# Patient Record
Sex: FEMALE | HISPANIC OR LATINO | Employment: UNEMPLOYED | URBAN - METROPOLITAN AREA
[De-identification: names, ages, dates, MRNs, and addresses within clinical notes are randomized per-mention and may not be internally consistent; named-entity substitution may affect disease eponyms.]

---
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
## 2023-01-02 ENCOUNTER — HOSPITAL ENCOUNTER (EMERGENCY)
Facility: HOSPITAL | Age: 23
Discharge: HOME | End: 2023-01-02
Attending: EMERGENCY MEDICINE

## 2023-01-02 VITALS
OXYGEN SATURATION: 100 % | SYSTOLIC BLOOD PRESSURE: 102 MMHG | RESPIRATION RATE: 16 BRPM | TEMPERATURE: 98.3 F | DIASTOLIC BLOOD PRESSURE: 58 MMHG | HEART RATE: 100 BPM

## 2023-01-02 DIAGNOSIS — R10.9 ABDOMINAL PAIN: ICD-10-CM

## 2023-01-02 DIAGNOSIS — K52.9 GASTROENTERITIS: Primary | ICD-10-CM

## 2023-01-02 LAB
EXT PREGNANCY TEST URINE: NEGATIVE
EXT. CONTROL: NORMAL

## 2023-01-02 RX ORDER — ONDANSETRON 4 MG/1
4 TABLET, ORALLY DISINTEGRATING ORAL ONCE
Status: COMPLETED | OUTPATIENT
Start: 2023-01-02 | End: 2023-01-02

## 2023-01-02 RX ORDER — ONDANSETRON 4 MG/1
4 TABLET, FILM COATED ORAL EVERY 6 HOURS
Qty: 12 TABLET | Refills: 0 | Status: SHIPPED | OUTPATIENT
Start: 2023-01-02

## 2023-01-02 RX ORDER — ONDANSETRON 4 MG/1
4 TABLET, FILM COATED ORAL EVERY 6 HOURS
Qty: 12 TABLET | Refills: 0 | Status: SHIPPED | OUTPATIENT
Start: 2023-01-02 | End: 2023-01-02 | Stop reason: SDUPTHER

## 2023-01-02 RX ORDER — DICYCLOMINE HCL 20 MG
20 TABLET ORAL ONCE
Status: COMPLETED | OUTPATIENT
Start: 2023-01-02 | End: 2023-01-02

## 2023-01-02 RX ORDER — KETOROLAC TROMETHAMINE 30 MG/ML
15 INJECTION, SOLUTION INTRAMUSCULAR; INTRAVENOUS ONCE
Status: COMPLETED | OUTPATIENT
Start: 2023-01-02 | End: 2023-01-02

## 2023-01-02 RX ADMIN — DICYCLOMINE HYDROCHLORIDE 20 MG: 20 TABLET ORAL at 16:37

## 2023-01-02 RX ADMIN — KETOROLAC TROMETHAMINE 15 MG: 30 INJECTION, SOLUTION INTRAMUSCULAR; INTRAVENOUS at 16:37

## 2023-01-02 RX ADMIN — ONDANSETRON 4 MG: 4 TABLET, ORALLY DISINTEGRATING ORAL at 16:08

## 2023-01-02 NOTE — ED PROVIDER NOTES
History  Chief Complaint   Patient presents with   • Abdominal Pain     Started after patient ate with abdominal pain, nausea, vomiting and diarrhea  Possibly thinks it's food poisoning  19-year-old female patient presenting with nausea vomiting diarrhea, abdominal pain onset yesterday  Patient states that she had "cheese dogs and sandwich" yesterday and since then has been having diarrhea  Patient states multiple episodes of nonbloody diarrhea yesterday  Patient states that she has been having abdominal pain and woke up with abdominal pain this morning  Patient states multiple episodes of nonbloody vomiting today  States LMP was 2 weeks ago  Denies fever, chest pain, shortness of breath, cough, urinary symptoms  Denies taking any medications for symptoms  Prior to Admission Medications   Prescriptions Last Dose Informant Patient Reported? Taking?   predniSONE 20 mg tablet   No No   Sig: Take 2 tabs daily for 5 days  Patient not taking: Reported on 2/28/2019      Facility-Administered Medications: None       Past Medical History:   Diagnosis Date   • Asthma 9/18/2012   • Otitis media    • Visual impairment     wears glasses       History reviewed  No pertinent surgical history  Family History   Problem Relation Age of Onset   • No Known Problems Mother    • Diabetes Father    • Hypertension Father      I have reviewed and agree with the history as documented  E-Cigarette/Vaping     E-Cigarette/Vaping Substances     Social History     Tobacco Use   • Smoking status: Never   • Smokeless tobacco: Never   Substance Use Topics   • Alcohol use: Never   • Drug use: Never        Review of Systems   Constitutional: Negative for fever  Gastrointestinal: Positive for abdominal pain, diarrhea, nausea and vomiting  All other systems reviewed and are negative        Physical Exam  ED Triage Vitals [01/02/23 1422]   Temperature Pulse Respirations Blood Pressure SpO2   98 3 °F (36 8 °C) 100 16 102/58 100 %      Temp Source Heart Rate Source Patient Position - Orthostatic VS BP Location FiO2 (%)   Oral Monitor Sitting Left arm --      Pain Score       10 - Worst Possible Pain             Orthostatic Vital Signs  Vitals:    01/02/23 1422   BP: 102/58   Pulse: 100   Patient Position - Orthostatic VS: Sitting       Physical Exam  Vitals reviewed  Constitutional:       Appearance: Normal appearance  HENT:      Head: Normocephalic and atraumatic  Nose: Nose normal       Mouth/Throat:      Mouth: Mucous membranes are moist       Pharynx: Oropharynx is clear  Eyes:      Extraocular Movements: Extraocular movements intact  Conjunctiva/sclera: Conjunctivae normal    Cardiovascular:      Rate and Rhythm: Normal rate and regular rhythm  Pulses: Normal pulses  Heart sounds: Normal heart sounds  Pulmonary:      Effort: Pulmonary effort is normal       Breath sounds: Normal breath sounds  Abdominal:      General: Bowel sounds are normal       Palpations: Abdomen is soft  Tenderness: There is abdominal tenderness (Mild tenderness) in the right lower quadrant  There is no guarding or rebound  Negative signs include Rovsing's sign, psoas sign and obturator sign  Musculoskeletal:         General: Normal range of motion  Cervical back: Normal range of motion  Skin:     General: Skin is warm and dry  Neurological:      General: No focal deficit present  Mental Status: She is alert and oriented to person, place, and time  Mental status is at baseline           ED Medications  Medications   ondansetron (ZOFRAN-ODT) dispersible tablet 4 mg (4 mg Oral Given 1/2/23 1608)   dicyclomine (BENTYL) tablet 20 mg (20 mg Oral Given 1/2/23 1637)   ketorolac (TORADOL) injection 15 mg (15 mg Intramuscular Given 1/2/23 1637)       Diagnostic Studies  Results Reviewed     Procedure Component Value Units Date/Time    POCT pregnancy, urine [[id number]]  (Normal) Resulted: 01/02/23 1630    Lab Status: Final result Updated: 01/02/23 1719     EXT Preg Test, Ur Negative     Control Valid                 No orders to display         Procedures  Procedures      ED Course                                       Medical Decision Making  24 y/o female patient with history of asthma presenting with nausea vomiting diarrhea, abdominal pain onset yesterday  Patient states started after she had breakfast yesterday  Likely from what she ate versus gastroenteritis  No fevers or cold-like symptoms  Patient treated with Bentyl, Toradol, Zofran with improvement of symptoms  Patient able to tolerate p o  and drinking and eating  Abdominal pain: acute illness or injury  Gastroenteritis: acute illness or injury  Amount and/or Complexity of Data Reviewed  Labs: ordered  Details: Urine pregnancy negative  Discussion of management or test interpretation with external provider(s): Likely with gastroenteritis  Patient tx with zofran, toradol and bentyl  Patient improved, toelrating PO  Patient discharged with zofran  Stable for discharge with follow-up with PCP  Return precautions given  Risk  Prescription drug management  Disposition  Final diagnoses:   Gastroenteritis   Abdominal pain     Time reflects when diagnosis was documented in both MDM as applicable and the Disposition within this note     Time User Action Codes Description Comment    1/2/2023  5:08 PM Clementina TAYLOR [facility] Add [K52 9] Gastroenteritis     1/2/2023  5:08 PM Damaris Urbina Add [R10 9] Abdominal pain       ED Disposition     ED Disposition   Discharge    Condition   Stable    Date/Time   Mon Jan 2, 2023  5:08 PM    Comment   Eva Araujo discharge to home/self care                 Follow-up Information     Follow up With Specialties Details Why Contact Info    Shasta Terrell MD Pediatrics Schedule an appointment as soon as possible for a visit   [address]  [address] [facility],[address]  [address]            Discharge Medication List as of 1/2/2023  5:12 PM      START taking these medications    Details   ondansetron (ZOFRAN) 4 mg tablet Take 1 tablet (4 mg total) by mouth every 6 (six) hours, Starting Mon 1/2/2023, Normal         CONTINUE these medications which have NOT CHANGED    Details   predniSONE 20 mg tablet Take 2 tabs daily for 5 days  , Print           No discharge procedures on file  PDMP Review     None           ED Provider  Attending physically available and evaluated Robertlesley Kirti CHANG managed the patient along with the ED Attending      Electronically Signed by         Floresita Whitley MD  01/03/23 [id number]

## 2023-01-02 NOTE — ED ATTENDING ATTESTATION
1/2/2023  ICamilo MD, saw and evaluated the patient  I have discussed the patient with the resident/non-physician practitioner and agree with the resident's/non-physician practitioner's findings, Plan of Care, and MDM as documented in the resident's/non-physician practitioner's note, except where noted  All available labs and Radiology studies were reviewed  I was present for key portions of any procedure(s) performed by the resident/non-physician practitioner and I was immediately available to provide assistance  At this point I agree with the current assessment done in the Emergency Department  I have conducted an independent evaluation of this patient a history and physical is as follows:    ED Course         Critical Care Time  Procedures    22 yo female with no pmh, c/o abdominal pain n/v/d since yesterday after eating a corndog  No other sick contacts  No urinary complaints, no fever  Pt with abdominal pain and cramping  No cp, no sob  Pt tolerating liquids  Vss, afebrile, tachy, lungs cta, rrr, abdomen soft tender rlq, no rebound, no guarding  Likely food poisoning, viral illness  Bentyl, zofran, toradol im, po challenge  No concern for appendicitis

## 2023-01-02 NOTE — DISCHARGE INSTRUCTIONS
You were seen in the ED for gastroenteritis  Return to the ED for any worsening symptoms or new symptoms  Follow up with your primary care doctor as soon as possible  Take zofran for your symptoms

## 2023-01-02 NOTE — Clinical Note
Cosmo Panda was seen and treated in our emergency department on 1/2/2023  Diagnosis: gastroenteritis    Freida Osorio  may return to work on return date  She may return on this date: 01/04/2023         If you have any questions or concerns, please don't hesitate to call        Debra French MD    ______________________________           _______________          _______________  [facility] Representative                              Date                                Time

## 2023-03-15 ENCOUNTER — TELEPHONE (OUTPATIENT)
Dept: OBGYN CLINIC | Facility: CLINIC | Age: 23
End: 2023-03-15

## 2023-03-15 NOTE — TELEPHONE ENCOUNTER
Pt submitted email request for gyn appt, documenting that it may be urgent  LM for pt to contact me for appt assistance

## 2023-03-27 ENCOUNTER — OFFICE VISIT (OUTPATIENT)
Dept: OBGYN CLINIC | Facility: CLINIC | Age: 23
End: 2023-03-27

## 2023-03-27 VITALS
SYSTOLIC BLOOD PRESSURE: 100 MMHG | HEART RATE: 68 BPM | WEIGHT: 106.8 LBS | BODY MASS INDEX: 20.97 KG/M2 | DIASTOLIC BLOOD PRESSURE: 62 MMHG | HEIGHT: 60 IN

## 2023-03-27 DIAGNOSIS — N89.8 VAGINAL DISCHARGE: ICD-10-CM

## 2023-03-27 DIAGNOSIS — B37.31 VULVOVAGINAL CANDIDIASIS: Primary | ICD-10-CM

## 2023-03-27 DIAGNOSIS — R30.0 BURNING WITH URINATION: ICD-10-CM

## 2023-03-27 DIAGNOSIS — Z11.3 SCREEN FOR STD (SEXUALLY TRANSMITTED DISEASE): ICD-10-CM

## 2023-03-27 LAB
BV WHIFF TEST VAG QL: NEGATIVE
CLUE CELLS SPEC QL WET PREP: NEGATIVE
PH SMN: 4 [PH]
SL AMB POCT WET MOUNT: ABNORMAL
T VAGINALIS VAG QL WET PREP: NEGATIVE
YEAST VAG QL WET PREP: POSITIVE

## 2023-03-27 RX ORDER — FLUCONAZOLE 150 MG/1
150 TABLET ORAL ONCE
Qty: 1 TABLET | Refills: 0 | Status: SHIPPED | OUTPATIENT
Start: 2023-03-27 | End: 2023-03-27

## 2023-03-27 RX ORDER — TRIAMCINOLONE ACETONIDE 1 MG/G
CREAM TOPICAL 2 TIMES DAILY
Qty: 30 G | Refills: 0 | Status: SHIPPED | OUTPATIENT
Start: 2023-03-27

## 2023-03-27 NOTE — PROGRESS NOTES
PROBLEM GYNECOLOGICAL VISIT    Ruthie Lilly is a 23 y o  female who presents today with complaint of vaginal discharge  Her general medical history has been reviewed and she reports it as follows:    Past Medical History:   Diagnosis Date   • Asthma 9/18/2012   • Otitis media    • Visual impairment     wears glasses     History reviewed  No pertinent surgical history  OB History    No obstetric history on file  Social History     Tobacco Use   • Smoking status: Never   • Smokeless tobacco: Never   Vaping Use   • Vaping Use: Never used   Substance Use Topics   • Alcohol use: Never   • Drug use: Never     Social History     Substance and Sexual Activity   Sexual Activity Yes   • Partners: Male   • Birth control/protection: None    Comment: once       Current Outpatient Medications   Medication Instructions   • ondansetron (ZOFRAN) 4 mg, Oral, Every 6 hours   • predniSONE 20 mg tablet Take 2 tabs daily for 5 days  History of Present Illness:   Matt Babb presents today reporting vaginal discharge  She reports that initially symptoms started a week or so ago as discomfort with urination  She then noticed mild swelling in her labia, which has since resolved  She then developed vulvar itching and clumpy, white discharge  She reports a new sexual partner recently  Does frequently use Kam Lamonte wash and scented wipes  Review of Systems:  Review of Systems   Constitutional: Negative  Gastrointestinal: Negative  Genitourinary: Positive for vaginal discharge  Physical Exam:  /62 (BP Location: Right arm, Patient Position: Sitting, Cuff Size: Adult)   Pulse 68   Ht 5' (1 524 m)   Wt 48 4 kg (106 lb 12 8 oz)   LMP 03/07/2023   BMI 20 86 kg/m²   Physical Exam  Constitutional:       General: She is not in acute distress  Appearance: Normal appearance  Genitourinary:      No lesions in the vagina  Vulva exam comments: Erythema   Vaginal discharge and erythema present  Right Adnexa: not tender, not full and no mass present  Left Adnexa: not tender, not full and no mass present  No cervical motion tenderness or lesion  Neurological:      Mental Status: She is alert  Skin:     General: Skin is warm and dry  Psychiatric:         Mood and Affect: Mood normal          Behavior: Behavior normal    Vitals reviewed  Point of Care Testing:   -Wet mount: +yeast, -clue cells, -trich    -KOH mount: +yeast    -Whiff: negative    -pH 4    Assessment:   1  Vulvovaginal candidiasis    2  STI screening     Plan:   1  Rx for Diflucan  Rx for topical triamcinolone cream     2  GC/CT culture collected    3  Orders placed for HIV, hep B, hep C and syphilis blood screenings  4  Discussed vulvar skin care measures  Recommended discontinuing Lv Fletcher products and scented wipes  5  Return for annual and pap smear in October after 23st birthday  Reviewed with patient that test results are available in MyChart immediately, but that they will not necessarily be reviewed by me immediately  Explained that I will review results at my earliest opportunity and contact patient appropriately

## 2023-03-28 LAB — BACTERIA UR CULT: NORMAL

## 2023-03-29 LAB
M GENITALIUM DNA SPEC QL NAA+PROBE: NEGATIVE
T VAGINALIS DNA SPEC QL NAA+PROBE: NEGATIVE

## 2023-04-03 ENCOUNTER — TELEPHONE (OUTPATIENT)
Dept: OBGYN CLINIC | Facility: CLINIC | Age: 23
End: 2023-04-03

## 2023-04-03 LAB
C TRACH DNA SPEC QL NAA+PROBE: ABNORMAL
N GONORRHOEA DNA SPEC QL NAA+PROBE: ABNORMAL

## 2023-04-03 NOTE — TELEPHONE ENCOUNTER
S/w pt gave her results, pt stated that she will come into the office to rpt gcct, appt scheduled for 4/4/ with FM resident

## 2023-04-03 NOTE — TELEPHONE ENCOUNTER
----- Message from [address] [place] [address] sent at 4/3/2023  2:30 PM EDT -----  Could we please let her know that the gonorrhea/chlamydia culture was unable to be processed, this was likely due to the yeast discharge she had  She can either have this recollected in our office or in the outpatient lab with a urine sample  The tri  chomonas test was negative  Thank you!

## 2023-04-04 ENCOUNTER — OFFICE VISIT (OUTPATIENT)
Dept: OBGYN CLINIC | Facility: CLINIC | Age: 23
End: 2023-04-04

## 2023-04-04 VITALS
WEIGHT: 106.8 LBS | DIASTOLIC BLOOD PRESSURE: 63 MMHG | BODY MASS INDEX: 20.97 KG/M2 | HEIGHT: 60 IN | HEART RATE: 63 BPM | SYSTOLIC BLOOD PRESSURE: 114 MMHG

## 2023-04-04 DIAGNOSIS — Z11.3 SCREEN FOR STD (SEXUALLY TRANSMITTED DISEASE): Primary | ICD-10-CM

## 2023-04-04 NOTE — ASSESSMENT & PLAN NOTE
High risk of STI with multiple partners and does not use condoms  GC/CT collected at previous visit unable to be processed possibly due to yeast discharge  -recollected via speculum exam on this visit  Again thick white discharge visualized though pt reports symptoms have improved  -in the case for some reason sample again cannot be processed, will order urine lab collect for GC/CT  Discussed with pt she can go to lab for this if today's culture is unable to be processed and she is agreeable   Locations reviewed  -counseling on condom use and decreasing risk of STI   -encouraged to get labwork ordered at previous visit for screenings  -f/u as needed

## 2023-04-04 NOTE — PROGRESS NOTES
Assessment/Plan:    Screen for STD (sexually transmitted disease)  High risk of STI with multiple partners and does not use condoms  GC/CT collected at previous visit unable to be processed possibly due to yeast discharge  -recollected via speculum exam on this visit  Again thick white discharge visualized though pt reports symptoms have improved  -in the case for some reason sample again cannot be processed, will order urine lab collect for GC/CT  Discussed with pt she can go to lab for this if today's culture is unable to be processed and she is agreeable  Locations reviewed  -counseling on condom use and decreasing risk of STI   -encouraged to get labwork ordered at previous visit for screenings  -f/u as needed        Diagnoses and all orders for this visit:    Screen for STD (sexually transmitted disease)  -     Chlamydia/GC amplified DNA by PCR  -     Chlamydia/GC amplified DNA by PCR; Future          Subjective:      Patient ID: Em Casanova is a 23 y o  female  HPI   24 yo female presenting for retest GC/CT  Last visit on 3/27/23 for complaint of vaginal discharge and found to have yeast which was treated  GC/CT culture collected at the time however it was unable to be processed possibly because of the yeast discharge  Pt wanted to come back into office for recollection  Pt reports sexually active with one partner but previously 2 partners, does not use condoms  She does report previous discharge has now improved and she no longer has symptoms  Did not complete labwork ordered at previous visit as she did not know where to go for the bloodwork  This was reviewed with patient  The following portions of the patient's history were reviewed and updated as appropriate: allergies, current medications, past family history, past medical history, past social history, past surgical history and problem list     Review of Systems   Constitutional: Negative for chills and fever     HENT: Negative for ear pain and sore throat  Eyes: Negative for pain and visual disturbance  Respiratory: Negative for cough and shortness of breath  Cardiovascular: Negative for chest pain and palpitations  Gastrointestinal: Negative for abdominal pain and vomiting  Genitourinary: Negative for difficulty urinating, dysuria, frequency, hematuria, urgency, vaginal discharge and vaginal pain  Musculoskeletal: Negative for arthralgias and back pain  Skin: Negative for color change and rash  Neurological: Negative for seizures, syncope and headaches  All other systems reviewed and are negative  Objective:    /63   Pulse 63   Ht 5' (1 524 m)   Wt 48 4 kg (106 lb 12 8 oz)   LMP 03/07/2023   BMI 20 86 kg/m²      Physical Exam  Vitals reviewed  Exam conducted with a chaperone present  Constitutional:       General: She is not in acute distress  Appearance: Normal appearance  She is well-developed  HENT:      Head: Normocephalic and atraumatic  Eyes:      Extraocular Movements: Extraocular movements intact  Cardiovascular:      Rate and Rhythm: Normal rate and regular rhythm  Pulmonary:      Effort: Pulmonary effort is normal  No respiratory distress  Breath sounds: Normal breath sounds  Abdominal:      General: Abdomen is flat  Palpations: Abdomen is soft  Genitourinary:     General: Normal vulva  Vagina: Vaginal discharge present  Cervix: Normal       Comments: Speculum exam performed for sample collection  Thick white discharge noted  Otherwise no erythema, lesions, or other abnormalities   Musculoskeletal:         General: No tenderness or deformity  Normal range of motion  Cervical back: Normal range of motion and neck supple  Skin:     General: Skin is warm and dry  Findings: No rash  Neurological:      General: No focal deficit present  Mental Status: She is alert  Mental status is at baseline     Psychiatric:         Mood and Affect: Mood normal  Behavior: Behavior normal              Valdo Lockett MD, PGY3  [facility]  Date: 4/4/2023 Time: 8:59 AM

## 2023-04-05 LAB
C TRACH DNA SPEC QL NAA+PROBE: NEGATIVE
N GONORRHOEA DNA SPEC QL NAA+PROBE: NEGATIVE

## 2023-05-11 ENCOUNTER — TELEPHONE (OUTPATIENT)
Dept: PEDIATRICS CLINIC | Facility: CLINIC | Age: 23
End: 2023-05-11

## 2023-05-11 NOTE — TELEPHONE ENCOUNTER
Please remove our office as the pcp as patient is out of our age range and was never seen at our practice

## 2023-06-03 PROBLEM — Z11.3 SCREEN FOR STD (SEXUALLY TRANSMITTED DISEASE): Status: RESOLVED | Noted: 2023-04-04 | Resolved: 2023-06-03

## 2023-11-09 ENCOUNTER — APPOINTMENT (EMERGENCY)
Dept: CT IMAGING | Facility: HOSPITAL | Age: 23
End: 2023-11-09
Payer: COMMERCIAL

## 2023-11-09 ENCOUNTER — HOSPITAL ENCOUNTER (EMERGENCY)
Facility: HOSPITAL | Age: 23
Discharge: HOME | End: 2023-11-09
Attending: EMERGENCY MEDICINE
Payer: COMMERCIAL

## 2023-11-09 VITALS
SYSTOLIC BLOOD PRESSURE: 121 MMHG | TEMPERATURE: 97.9 F | RESPIRATION RATE: 18 BRPM | HEART RATE: 72 BPM | OXYGEN SATURATION: 99 % | DIASTOLIC BLOOD PRESSURE: 55 MMHG

## 2023-11-09 DIAGNOSIS — N20.1 LEFT URETERAL STONE: Primary | ICD-10-CM

## 2023-11-09 DIAGNOSIS — N23 RENAL COLIC: ICD-10-CM

## 2023-11-09 LAB
ALBUMIN SERPL BCP-MCNC: 4.2 G/DL (ref 3.5–5)
ALP SERPL-CCNC: 65 U/L (ref 34–104)
ALT SERPL W P-5'-P-CCNC: 8 U/L (ref 7–52)
ANION GAP SERPL CALCULATED.3IONS-SCNC: 5 MMOL/L
AST SERPL W P-5'-P-CCNC: 14 U/L (ref 13–39)
BACTERIA UR QL AUTO: ABNORMAL /HPF
BASOPHILS # BLD AUTO: 0.06 THOUSANDS/ÂΜL (ref 0–0.1)
BASOPHILS NFR BLD AUTO: 1 % (ref 0–1)
BILIRUB SERPL-MCNC: 0.44 MG/DL (ref 0.2–1)
BILIRUB UR QL STRIP: NEGATIVE
BUN SERPL-MCNC: 15 MG/DL (ref 5–25)
CALCIUM SERPL-MCNC: 8.6 MG/DL (ref 8.4–10.2)
CHLORIDE SERPL-SCNC: 106 MMOL/L (ref 96–108)
CLARITY UR: CLEAR
CO2 SERPL-SCNC: 24 MMOL/L (ref 21–32)
COLOR UR: YELLOW
CREAT SERPL-MCNC: 0.87 MG/DL (ref 0.6–1.3)
EOSINOPHIL # BLD AUTO: 0.17 THOUSAND/ÂΜL (ref 0–0.61)
EOSINOPHIL NFR BLD AUTO: 2 % (ref 0–6)
ERYTHROCYTE [DISTWIDTH] IN BLOOD BY AUTOMATED COUNT: 13 % (ref 11.6–15.1)
EXT PREGNANCY TEST URINE: NEGATIVE
EXT. CONTROL: NORMAL
GFR SERPL CREATININE-BSD FRML MDRD: 95 ML/MIN/1.73SQ M
GLUCOSE SERPL-MCNC: 104 MG/DL (ref 65–140)
GLUCOSE UR STRIP-MCNC: NEGATIVE MG/DL
HCT VFR BLD AUTO: 41.9 % (ref 34.8–46.1)
HGB BLD-MCNC: 13.4 G/DL (ref 11.5–15.4)
HGB UR QL STRIP.AUTO: ABNORMAL
IMM GRANULOCYTES # BLD AUTO: 0.07 THOUSAND/UL (ref 0–0.2)
IMM GRANULOCYTES NFR BLD AUTO: 1 % (ref 0–2)
KETONES UR STRIP-MCNC: NEGATIVE MG/DL
LEUKOCYTE ESTERASE UR QL STRIP: NEGATIVE
LIPASE SERPL-CCNC: 36 U/L (ref 11–82)
LYMPHOCYTES # BLD AUTO: 3.57 THOUSANDS/ÂΜL (ref 0.6–4.47)
LYMPHOCYTES NFR BLD AUTO: 36 % (ref 14–44)
MCH RBC QN AUTO: 28.1 PG (ref 26.8–34.3)
MCHC RBC AUTO-ENTMCNC: 32 G/DL (ref 31.4–37.4)
MCV RBC AUTO: 88 FL (ref 82–98)
MONOCYTES # BLD AUTO: 0.73 THOUSAND/ÂΜL (ref 0.17–1.22)
MONOCYTES NFR BLD AUTO: 7 % (ref 4–12)
MUCOUS THREADS UR QL AUTO: ABNORMAL
NEUTROPHILS # BLD AUTO: 5.46 THOUSANDS/ÂΜL (ref 1.85–7.62)
NEUTS SEG NFR BLD AUTO: 53 % (ref 43–75)
NITRITE UR QL STRIP: NEGATIVE
NON-SQ EPI CELLS URNS QL MICRO: ABNORMAL /HPF
NRBC BLD AUTO-RTO: 0 /100 WBCS
PH UR STRIP.AUTO: 5.5 [PH]
PLATELET # BLD AUTO: 270 THOUSANDS/UL (ref 149–390)
PMV BLD AUTO: 9.7 FL (ref 8.9–12.7)
POTASSIUM SERPL-SCNC: 3.5 MMOL/L (ref 3.5–5.3)
PROT SERPL-MCNC: 7.3 G/DL (ref 6.4–8.4)
PROT UR STRIP-MCNC: ABNORMAL MG/DL
RBC # BLD AUTO: 4.77 MILLION/UL (ref 3.81–5.12)
RBC #/AREA URNS AUTO: ABNORMAL /HPF
SODIUM SERPL-SCNC: 135 MMOL/L (ref 135–147)
SP GR UR STRIP.AUTO: 1.03 (ref 1–1.03)
UROBILINOGEN UR STRIP-ACNC: <2 MG/DL
WBC # BLD AUTO: 10.06 THOUSAND/UL (ref 4.31–10.16)
WBC #/AREA URNS AUTO: ABNORMAL /HPF

## 2023-11-09 PROCEDURE — 99284 EMERGENCY DEPT VISIT MOD MDM: CPT

## 2023-11-09 PROCEDURE — 80053 COMPREHEN METABOLIC PANEL: CPT

## 2023-11-09 PROCEDURE — G1004 CDSM NDSC: HCPCS

## 2023-11-09 PROCEDURE — 96361 HYDRATE IV INFUSION ADD-ON: CPT

## 2023-11-09 PROCEDURE — 36415 COLL VENOUS BLD VENIPUNCTURE: CPT

## 2023-11-09 PROCEDURE — 96374 THER/PROPH/DIAG INJ IV PUSH: CPT

## 2023-11-09 PROCEDURE — 99285 EMERGENCY DEPT VISIT HI MDM: CPT

## 2023-11-09 PROCEDURE — 83690 ASSAY OF LIPASE: CPT

## 2023-11-09 PROCEDURE — 81025 URINE PREGNANCY TEST: CPT

## 2023-11-09 PROCEDURE — 81001 URINALYSIS AUTO W/SCOPE: CPT

## 2023-11-09 PROCEDURE — 74177 CT ABD & PELVIS W/CONTRAST: CPT

## 2023-11-09 PROCEDURE — 85025 COMPLETE CBC W/AUTO DIFF WBC: CPT

## 2023-11-09 PROCEDURE — 96375 TX/PRO/DX INJ NEW DRUG ADDON: CPT

## 2023-11-09 RX ORDER — ONDANSETRON 4 MG/1
4 TABLET, ORALLY DISINTEGRATING ORAL EVERY 6 HOURS PRN
Qty: 20 TABLET | Refills: 0 | Status: SHIPPED | OUTPATIENT
Start: 2023-11-09 | End: 2023-11-14

## 2023-11-09 RX ORDER — KETOROLAC TROMETHAMINE 30 MG/ML
15 INJECTION, SOLUTION INTRAMUSCULAR; INTRAVENOUS ONCE
Status: COMPLETED | OUTPATIENT
Start: 2023-11-09 | End: 2023-11-09

## 2023-11-09 RX ORDER — IBUPROFEN 600 MG/1
600 TABLET ORAL 3 TIMES DAILY
Qty: 15 TABLET | Refills: 0 | Status: SHIPPED | OUTPATIENT
Start: 2023-11-09 | End: 2023-11-14

## 2023-11-09 RX ORDER — ONDANSETRON 2 MG/ML
4 INJECTION INTRAMUSCULAR; INTRAVENOUS ONCE
Status: COMPLETED | OUTPATIENT
Start: 2023-11-09 | End: 2023-11-09

## 2023-11-09 RX ORDER — OXYCODONE HYDROCHLORIDE 5 MG/1
5 TABLET ORAL EVERY 6 HOURS PRN
Qty: 6 TABLET | Refills: 0 | Status: SHIPPED | OUTPATIENT
Start: 2023-11-09

## 2023-11-09 RX ORDER — MORPHINE SULFATE 4 MG/ML
4 INJECTION, SOLUTION INTRAMUSCULAR; INTRAVENOUS ONCE
Status: COMPLETED | OUTPATIENT
Start: 2023-11-09 | End: 2023-11-09

## 2023-11-09 RX ORDER — ACETAMINOPHEN 500 MG
1000 TABLET ORAL 3 TIMES DAILY PRN
Qty: 30 TABLET | Refills: 0 | Status: SHIPPED | OUTPATIENT
Start: 2023-11-09 | End: 2023-11-14

## 2023-11-09 RX ADMIN — KETOROLAC TROMETHAMINE 15 MG: 30 INJECTION, SOLUTION INTRAMUSCULAR; INTRAVENOUS at 03:38

## 2023-11-09 RX ADMIN — MORPHINE SULFATE 4 MG: 4 INJECTION INTRAVENOUS at 04:20

## 2023-11-09 RX ADMIN — IOHEXOL 80 ML: 350 INJECTION, SOLUTION INTRAVENOUS at 04:07

## 2023-11-09 RX ADMIN — SODIUM CHLORIDE 1000 ML: 0.9 INJECTION, SOLUTION INTRAVENOUS at 03:41

## 2023-11-09 RX ADMIN — ONDANSETRON 4 MG: 2 INJECTION INTRAMUSCULAR; INTRAVENOUS at 03:37

## 2023-11-09 NOTE — Clinical Note
Angela Gaming was seen and treated in our emergency department on 11/9/2023.    ?    ? ? Diagnosis: ? Kellie Porras  is off the rest of the shift today, may return to work on return date. She may return on this date: 11/11/2023    ? If you have any questions or concerns, please don't hesitate to call.       Ludivina Posada    ______________________________           _______________          _______________  Hospital Representative                              Date                                Time

## 2023-11-09 NOTE — ED PROVIDER NOTES
History  Chief Complaint   Patient presents with    Abdominal Pain     Pt c/o 9/10 lower abdominal pain starting a few minutes ago, +nausea     Patient is a 82-year-old female with PMH of asthma presenting for evaluation of sudden onset of abdominal pain. The patient notes awaking suddenly at approximately 2:30 AM with severe lower abdominal pain (30min PTA). She notes it has been constant, comes in waves, and is described as "like something is cutting me". She has never had pain like this before. She denies associated fevers or chills. She endorses associated nausea but denies vomiting and diarrhea. She denies CP/SOB, flank pain, urinary urgency/frequency, foul smell, hematuria, and dysuria. She is uncertain if she may be pregnant, however her last menstrual period was on 10/16/2023 (3 weeks PTA). She denies prior abdominal surgeries. History provided by:  Patient   used: No        Prior to Admission Medications   Prescriptions Last Dose Informant Patient Reported? Taking?   ondansetron (ZOFRAN) 4 mg tablet   No No   Sig: Take 1 tablet (4 mg total) by mouth every 6 (six) hours   Patient not taking: Reported on 3/27/2023   predniSONE 20 mg tablet   No No   Sig: Take 2 tabs daily for 5 days. Patient not taking: Reported on 2/28/2019   triamcinolone (KENALOG) 0.1 % cream   No No   Sig: Apply topically 2 (two) times a day   Patient not taking: Reported on 4/4/2023      Facility-Administered Medications: None       Past Medical History:   Diagnosis Date    Asthma 9/18/2012    Otitis media     Visual impairment     wears glasses       History reviewed. No pertinent surgical history. Family History   Problem Relation Age of Onset    No Known Problems Mother     Diabetes Father     Hypertension Father      I have reviewed and agree with the history as documented.     E-Cigarette/Vaping    E-Cigarette Use Never User      E-Cigarette/Vaping Substances    Nicotine No     THC No     CBD No Flavoring No      Social History     Tobacco Use    Smoking status: Never    Smokeless tobacco: Never   Vaping Use    Vaping Use: Never used   Substance Use Topics    Alcohol use: Never    Drug use: Never       Review of Systems   Constitutional:  Negative for chills and fever. Respiratory:  Negative for cough and shortness of breath. Cardiovascular:  Negative for chest pain. Gastrointestinal:  Positive for abdominal pain and nausea. Negative for abdominal distention, blood in stool, constipation, diarrhea, rectal pain and vomiting. Genitourinary:  Negative for decreased urine volume, difficulty urinating, dysuria, frequency, hematuria, urgency, vaginal bleeding, vaginal discharge and vaginal pain. Musculoskeletal:  Negative for back pain and gait problem. Allergic/Immunologic: Negative for immunocompromised state. Neurological:  Negative for dizziness, syncope, weakness, light-headedness, numbness and headaches. All other systems reviewed and are negative. Physical Exam  Physical Exam  Vitals and nursing note reviewed. Constitutional:       General: She is awake. She is in acute distress (Evidencing moderate discomfort and mild distress). Appearance: Normal appearance. She is well-developed and normal weight. She is not ill-appearing, toxic-appearing or diaphoretic. HENT:      Head: Normocephalic and atraumatic. Jaw: There is normal jaw occlusion. Nose: Nose normal.      Mouth/Throat:      Lips: Pink. No lesions. Mouth: Mucous membranes are moist.   Eyes:      General: Lids are normal. Vision grossly intact. Gaze aligned appropriately. Extraocular Movements: Extraocular movements intact. Conjunctiva/sclera: Conjunctivae normal.   Neck:      Trachea: Phonation normal. No abnormal tracheal secretions. Cardiovascular:      Rate and Rhythm: Normal rate and regular rhythm. Pulses:           Radial pulses are 2+ on the right side and 2+ on the left side. Dorsalis pedis pulses are 2+ on the right side and 2+ on the left side. Posterior tibial pulses are 2+ on the right side and 2+ on the left side. Heart sounds: Normal heart sounds, S1 normal and S2 normal. No murmur heard. Pulmonary:      Effort: Pulmonary effort is normal. No tachypnea or respiratory distress. Breath sounds: Normal breath sounds and air entry. No stridor, decreased air movement or transmitted upper airway sounds. No decreased breath sounds. Abdominal:      General: Bowel sounds are normal. There is no distension. Palpations: Abdomen is soft. Tenderness: There is abdominal tenderness (Diffuse lower abdominal tenderness, slightly worse on left than right) in the right lower quadrant, suprapubic area and left lower quadrant. There is no right CVA tenderness, left CVA tenderness, guarding or rebound. Negative signs include Carney's sign. Musculoskeletal:         General: Normal range of motion. Cervical back: Neck supple. Right lower leg: No edema. Left lower leg: No edema. Comments: WHITE, 5/5 strength throughout, sensation intact, no focal joint swelling. Ambulatory with steady gait. Skin:     General: Skin is warm and dry. Capillary Refill: Capillary refill takes 2 to 3 seconds. Coloration: Skin is pale (Appears mildly pale, family bedside notes she looks like her normal complexion). Findings: No rash or wound. Neurological:      General: No focal deficit present. Mental Status: She is alert and oriented to person, place, and time. Mental status is at baseline. GCS: GCS eye subscore is 4. GCS verbal subscore is 5. GCS motor subscore is 6. Psychiatric:         Behavior: Behavior is cooperative.          Vital Signs  ED Triage Vitals [11/09/23 0306]   Temperature Pulse Respirations Blood Pressure SpO2   97.9 °F (36.6 °C) 66 18 117/70 96 %      Temp Source Heart Rate Source Patient Position - Orthostatic VS BP Location FiO2 (%)   Oral Monitor -- Right arm --      Pain Score       9           Vitals:    11/09/23 0306 11/09/23 0422 11/09/23 0500   BP: 117/70 122/60 121/55   Pulse: 66 72 72         Visual Acuity      ED Medications  Medications   sodium chloride 0.9 % bolus 1,000 mL (0 mL Intravenous Stopped 11/9/23 0511)   ondansetron (ZOFRAN) injection 4 mg (4 mg Intravenous Given 11/9/23 0337)   ketorolac (TORADOL) injection 15 mg (15 mg Intravenous Given 11/9/23 0338)   iohexol (OMNIPAQUE) 350 MG/ML injection (MULTI-DOSE) 80 mL (80 mL Intravenous Given 11/9/23 0407)   morphine injection 4 mg (4 mg Intravenous Given 11/9/23 0420)       Diagnostic Studies  Results Reviewed       Procedure Component Value Units Date/Time    Urine Microscopic [[id number]]  (Abnormal) Collected: 11/09/23 0335    Lab Status: Final result Specimen: Urine, Clean Catch Updated: 11/09/23 0407     RBC, UA 10-20 /hpf      WBC, UA 2-4 /hpf      Epithelial Cells Occasional /hpf      Bacteria, UA None Seen /hpf      MUCUS THREADS Moderate    Comprehensive metabolic panel [[id number]] Collected: 11/09/23 0328    Lab Status: Final result Specimen: Blood from Arm, Right Updated: 11/09/23 0357     Sodium 135 mmol/L      Potassium 3.5 mmol/L      Chloride 106 mmol/L      CO2 24 mmol/L      ANION GAP 5 mmol/L      BUN 15 mg/dL      Creatinine 0.87 mg/dL      Glucose 104 mg/dL      Calcium 8.6 mg/dL      AST 14 U/L      ALT 8 U/L      Alkaline Phosphatase 65 U/L      Total Protein 7.3 g/dL      Albumin 4.2 g/dL      Total Bilirubin 0.44 mg/dL      eGFR 95 ml/min/1.73sq m     Narrative:      [place] guidelines for Chronic Kidney Disease (CKD):     Stage 1 with normal or high GFR (GFR > 90 mL/min/1.73 square meters)    Stage 2 Mild CKD (GFR = 60-89 mL/min/1.73 square meters)    Stage 3A Moderate CKD (GFR = 45-59 mL/min/1.73 square meters)    Stage 3B Moderate CKD (GFR = 30-44 mL/min/1.73 square meters)    Stage 4 Severe CKD (GFR = 15-29 mL/min/1.73 square meters)    Stage 5 End Stage CKD (GFR <15 mL/min/1.73 square meters)  Note: GFR calculation is accurate only with a steady state creatinine    Lipase [[id number]]  (Normal) Collected: 11/09/23 0328    Lab Status: Final result Specimen: Blood from Arm, Right Updated: 11/09/23 0357     Lipase 36 u/L     UA w Reflex to Microscopic w Reflex to Culture [[id number]]  (Abnormal) Collected: 11/09/23 0335    Lab Status: Final result Specimen: Urine, Clean Catch Updated: 11/09/23 0355     Color, UA Yellow     Clarity, UA Clear     Specific Gravity, UA 1.031     pH, UA 5.5     Leukocytes, UA Negative     Nitrite, UA Negative     Protein, UA Trace mg/dl      Glucose, UA Negative mg/dl      Ketones, UA Negative mg/dl      Urobilinogen, UA <2.0 mg/dl      Bilirubin, UA Negative     Occult Blood, UA Small    POCT pregnancy, urine [[id number]]  (Normal) Resulted: 11/09/23 0337    Lab Status: Final result Updated: 11/09/23 0344     EXT Preg Test, Ur Negative     Control Valid    CBC and differential [[id number]] Collected: 11/09/23 0328    Lab Status: Final result Specimen: Blood from Arm, Right Updated: 11/09/23 0336     WBC 10.06 Thousand/uL      RBC 4.77 Million/uL      Hemoglobin 13.4 g/dL      Hematocrit 41.9 %      MCV 88 fL      MCH 28.1 pg      MCHC 32.0 g/dL      RDW 13.0 %      MPV 9.7 fL      Platelets 431 Thousands/uL      nRBC 0 /100 WBCs      Neutrophils Relative 53 %      Immat GRANS % 1 %      Lymphocytes Relative 36 %      Monocytes Relative 7 %      Eosinophils Relative 2 %      Basophils Relative 1 %      Neutrophils Absolute 5.46 Thousands/µL      Immature Grans Absolute 0.07 Thousand/uL      Lymphocytes Absolute 3.57 Thousands/µL      Monocytes Absolute 0.73 Thousand/µL      Eosinophils Absolute 0.17 Thousand/µL      Basophils Absolute 0.06 Thousands/µL                    CT abdomen pelvis with contrast   Final Result by Linda Etienne MD (11/09 0441)      Mild left hydronephrosis and hydroureter, the cause of which appears to be a 2 mm calculus at the distal ureter. 16 mm involuting right ovarian cyst with a small amount of pelvic free fluid. Normal appendix visualized. Workstation performed: [id number]                    Procedures  Procedures         ED Course  ED Course as of 11/09/23 1959   Thu Nov 09, 2023   0316 Triage vital signs within normal limits and stable   0344 PREGNANCY TEST URINE: Negative  Noted negative; patient healthy and young, no history of kidney disease, will get her over to CT stat given severe onset of pain   0345 CBC and differential  No leukocytosis or gross anemia   0359 Comprehensive metabolic panel  Electrolyte derangement, no OC, normal random glucose, no transaminitis   0359 Lipase: 36  WNL   0359 UA w Reflex to Microscopic w Reflex to Culture(!)  Will await urine micro, patient without UTI-like symptoms   0416 On reassessment, pt continues to express discomfort to the lower abdomen with concentration on the left side. Repeat abd exam soft, tender to Llq, suprapubic regions, non-periotneal. Will review CT images recently uploaded. 5079 Urine Microscopic(!)  No evidence of infection, scant hematuria   0453 CT abdomen pelvis with contrast  FINDINGS:     ABDOMEN     LOWER CHEST:  No clinically significant abnormality identified in the visualized lower chest.     LIVER/BILIARY TREE:  Unremarkable. GALLBLADDER:  No calcified gallstones. No pericholecystic inflammatory change. SPLEEN:  Unremarkable. PANCREAS:  Unremarkable. ADRENAL GLANDS:  Unremarkable. KIDNEYS/URETERS: There is mild left hydronephrosis and hydroureter, the cause of which appears to be a 2 mm calculus of the distal ureter, just prior to the UVJ. The right kidney is unremarkable. STOMACH AND BOWEL:  Unremarkable. APPENDIX: A normal appendix was visualized.      ABDOMINOPELVIC CAVITY:  There is a small volume of free pelvic fluid, likely physiologic given the patient's age and gender. No pneumoperitoneum. No lymphadenopathy. VESSELS:  Unremarkable for patient's age. PELVIS     REPRODUCTIVE ORGANS: There is an involuting right ovarian cyst measuring 16 mm. URINARY BLADDER:  Unremarkable. ABDOMINAL WALL/INGUINAL REGIONS:  Unremarkable. OSSEOUS STRUCTURES:  No acute fracture or destructive osseous lesion. IMPRESSION:     Mild left hydronephrosis and hydroureter, the cause of which appears to be a 2 mm calculus at the distal ureter. 16 mm involuting right ovarian cyst with a small amount of pelvic free fluid. Normal appendix visualized. 5879 On reevaluation, patient sleeping comfortably. I woke her to discuss results of CT exam.  The patient does note earlier today while at work having twinges of pain to her left side. I discussed the finding of a 2 mm distal left ureteral stone which is likely the source the patient's pain given her abdominal exam and urine micro. Given overall improvement and stability, plan made for Tylenol, NSAIDs, as needed oxycodone for pain control as well as increasing fluids at home. Patient to follow-up with PCP for further evaluation. Will provide urology number in the case that she has recurrent stones. SBIRT 20yo+      Flowsheet Row Most Recent Value   Initial Alcohol Screen: US AUDIT-C     1. How often do you have a drink containing alcohol? 0 Filed at: 11/09/2023 0343   2. How many drinks containing alcohol do you have on a typical day you are drinking? 0 Filed at: 11/09/2023 0343   3b. FEMALE Any Age, or MALE 65+: How often do you have 4 or more drinks on one occassion? 0 Filed at: 11/09/2023 0343   Audit-C Score 0 Filed at: 11/09/2023 0874   REINA: How many times in the past year have you. .. Used an illegal drug or used a prescription medication for non-medical reasons?  Never Filed at: 11/09/2023 5463                      Medical Decision Making  DDx including but not limited to: Ovarian cyst, ovarian torsion, appendicitis, ureteral stone    W/u with finding of L ureteral stone. Consistent with pts PE of LLQ tenderness. Labs overall stable. Pain notably improved with toradol and morphine. Given no infection of urine and pain well tolerated with pt tolerating PO, will dc to home with supportive medications. Strict return precautions discussed. Pt in agreement with plan and demonstrates understanding by teach back method. She is with improved appearance, in no acute distress, and ambulatory with steady gait at time of discharge. Boyfriend at bedside providing safe transportation home. Problems Addressed:  Left ureteral stone: acute illness or injury  Renal colic: acute illness or injury    Amount and/or Complexity of Data Reviewed  Independent Historian: parent  Labs: ordered. Decision-making details documented in ED Course. Radiology: ordered. Decision-making details documented in ED Course. Risk  OTC drugs. Prescription drug management. Disposition  Final diagnoses:   Left ureteral stone   Renal colic     Time reflects when diagnosis was documented in both MDM as applicable and the Disposition within this note       Time User Action Codes Description Comment    11/9/2023  5:14 AM Tate Bustillo Add [N20.1] Left ureteral stone     11/9/2023  5:14 AM Tate Bustillo Add [U69] Renal colic           ED Disposition       ED Disposition   Discharge    Condition   Stable    Date/Time   Thu Nov 9, 2023 0514    Comment   [facility] discharge to home/self care.                    Follow-up Information       Follow up With Specialties Details Why Contact Info Additional [address] Family [facility] Schedule an appointment as soon as possible for a visit in 1 week or your primary care doctor [address] [place] [id number]  [address] [place] [address] Bunny Barrios [place] ([place]), [address]   [phone]            Discharge Medication List as of 11/9/2023  5:20 AM        START taking these medications    Details   acetaminophen (TYLENOL) 500 mg tablet Take 2 tablets (1,000 mg total) by mouth 3 (three) times a day as needed for mild pain or moderate pain for up to 5 days, Starting Thu 11/9/2023, Until Tue 11/14/2023 at 2359, Normal      ibuprofen (MOTRIN) 600 mg tablet Take 1 tablet (600 mg total) by mouth 3 (three) times a day for 5 days, Starting Thu 11/9/2023, Until Tue 11/14/2023, Normal      ondansetron (ZOFRAN-ODT) 4 mg disintegrating tablet Take 1 tablet (4 mg total) by mouth every 6 (six) hours as needed for nausea or vomiting for up to 5 days, Starting Thu 11/9/2023, Until Tue 11/14/2023 at 2359, Normal      oxyCODONE (Roxicodone) 5 immediate release tablet Take 1 tablet (5 mg total) by mouth every 6 (six) hours as needed for moderate pain Max Daily Amount: 20 mg, Starting Thu 11/9/2023, Normal           CONTINUE these medications which have NOT CHANGED    Details   ondansetron (ZOFRAN) 4 mg tablet Take 1 tablet (4 mg total) by mouth every 6 (six) hours, Starting Mon 1/2/2023, Normal      predniSONE 20 mg tablet Take 2 tabs daily for 5 days. , Print      triamcinolone (KENALOG) 0.1 % cream Apply topically 2 (two) times a day, Starting Mon 3/27/2023, Normal             No discharge procedures on file.     PDMP Review       None            ED Provider  Electronically Signed by             Sixto Harper  11/09/23 1959

## 2023-11-09 NOTE — DISCHARGE INSTRUCTIONS
Take the prescribed ibuprofen (Motrin) 3 times a day for the next 3 to 5 days. Take with food. Use the prescribed acetaminophen (Tylenol) up to 3 times a day as needed for mild to moderate breakthrough pain. Use the prescribed oxycodone as needed for only severe breakthrough pain. Use the prescribed ondansetron (Zofran) as needed for nausea and vomiting. Return to the ER if you develop fever, new or worsening pain, vomiting, difficulty urinating, weakness, confusion, or lethargy.

## 2024-04-19 ENCOUNTER — APPOINTMENT (EMERGENCY)
Dept: RADIOLOGY | Facility: HOSPITAL | Age: 24
End: 2024-04-19
Payer: COMMERCIAL

## 2024-04-19 ENCOUNTER — HOSPITAL ENCOUNTER (EMERGENCY)
Facility: HOSPITAL | Age: 24
Discharge: HOME | End: 2024-04-19
Attending: EMERGENCY MEDICINE
Payer: COMMERCIAL

## 2024-04-19 VITALS
OXYGEN SATURATION: 99 % | TEMPERATURE: 98 F | HEART RATE: 68 BPM | RESPIRATION RATE: 18 BRPM | SYSTOLIC BLOOD PRESSURE: 108 MMHG | DIASTOLIC BLOOD PRESSURE: 64 MMHG

## 2024-04-19 DIAGNOSIS — R05.2 SUBACUTE COUGH: Primary | ICD-10-CM

## 2024-04-19 PROCEDURE — 71046 X-RAY EXAM CHEST 2 VIEWS: CPT

## 2024-04-19 PROCEDURE — 99284 EMERGENCY DEPT VISIT MOD MDM: CPT | Performed by: EMERGENCY MEDICINE

## 2024-04-19 PROCEDURE — 99283 EMERGENCY DEPT VISIT LOW MDM: CPT

## 2024-04-19 RX ORDER — FLUTICASONE PROPIONATE 50 MCG
1 SPRAY, SUSPENSION (ML) NASAL DAILY
Qty: 16 G | Refills: 0 | Status: SHIPPED | OUTPATIENT
Start: 2024-04-19

## 2024-04-20 NOTE — ED ATTENDING ATTESTATION
4/19/2024  I, Gerard Merrill MD, saw and evaluated the patient. I have discussed the patient with the resident/non-physician practitioner and agree with the resident's/non-physician practitioner's findings, Plan of Care, and MDM as documented in the resident's/non-physician practitioner's note, except where noted. All available labs and Radiology studies were reviewed.  I was present for key portions of any procedure(s) performed by the resident/non-physician practitioner and I was immediately available to provide assistance.       At this point I agree with the current assessment done in the Emergency Department.  I have conducted an independent evaluation of this patient a history and physical is as follows: Dry cough, congestion.  No fever.  No pain.  No nausea or vomiting.  Describes viral syndrome about 3 weeks ago.  States cough worsens at night.    Chest x-ray with no acute cardiopulmonary abnormality identified  Pending evaluation.  Additional physical exam and vital signs unremarkable.  Suspect bronchitis.  Symptomatic management at home, PCP follow-up.    XR chest 2 views   ED Interpretation by Gerard Merrill MD (04/19 2104)   No acute cardiopulmonary abnormality identified on my independent evaluation of chest x-ray.              ED Course         Critical Care Time  Procedures

## 2024-04-20 NOTE — ED PROVIDER NOTES
"History  Chief Complaint   Patient presents with    Cough     Pt states she has had a dry cough for the last week. Pt states when she is \"sleeping, laying flat she wakes up gasping for air and sob\". Pt denies sore throat, fevers, chills.      HPI    Patient is a 23-year-old female with history of childhood asthma presenting with persistent cough.  She states cough has been ongoing for approximately 3 weeks.  She admits approximately 2 weeks ago she did have sensation of fevers, but did not take her temperature at that time.  Cough is not productive, she is not experiencing chest pain, nausea or vomiting.  Does not currently take any medications for asthma at this time and has not tried any over-the-counter remedies.  She is specifically concerned about ongoing mold issue in her apartment and how it may be affecting her breathing.    Prior to Admission Medications   Prescriptions Last Dose Informant Patient Reported? Taking?   ibuprofen (MOTRIN) 600 mg tablet   No No   Sig: Take 1 tablet (600 mg total) by mouth 3 (three) times a day for 5 days   ondansetron (ZOFRAN) 4 mg tablet   No No   Sig: Take 1 tablet (4 mg total) by mouth every 6 (six) hours   Patient not taking: Reported on 3/27/2023   ondansetron (ZOFRAN-ODT) 4 mg disintegrating tablet   No No   Sig: Take 1 tablet (4 mg total) by mouth every 6 (six) hours as needed for nausea or vomiting for up to 5 days   oxyCODONE (Roxicodone) 5 immediate release tablet   No No   Sig: Take 1 tablet (5 mg total) by mouth every 6 (six) hours as needed for moderate pain Max Daily Amount: 20 mg   predniSONE 20 mg tablet   No No   Sig: Take 2 tabs daily for 5 days.   Patient not taking: Reported on 2/28/2019   triamcinolone (KENALOG) 0.1 % cream   No No   Sig: Apply topically 2 (two) times a day   Patient not taking: Reported on 4/4/2023      Facility-Administered Medications: None       Past Medical History:   Diagnosis Date    Asthma 9/18/2012    Otitis media     Visual " impairment     wears glasses       History reviewed. No pertinent surgical history.    Family History   Problem Relation Age of Onset    No Known Problems Mother     Diabetes Father     Hypertension Father      I have reviewed and agree with the history as documented.    E-Cigarette/Vaping    E-Cigarette Use Never User      E-Cigarette/Vaping Substances    Nicotine No     THC No     CBD No     Flavoring No      Social History     Tobacco Use    Smoking status: Never    Smokeless tobacco: Never   Vaping Use    Vaping status: Never Used   Substance Use Topics    Alcohol use: Never    Drug use: Never        Review of Systems   Constitutional:  Negative for chills and fever.   HENT:  Negative for congestion and sore throat.    Eyes: Negative.    Respiratory:  Positive for cough. Negative for shortness of breath.    Cardiovascular:  Negative for chest pain and palpitations.   Gastrointestinal:  Negative for abdominal pain and vomiting.   Genitourinary:  Negative for dysuria and hematuria.   Musculoskeletal:  Negative for arthralgias and back pain.   Skin:  Negative for color change and rash.   Allergic/Immunologic: Positive for environmental allergies.   Neurological:  Negative for syncope and light-headedness.   All other systems reviewed and are negative.      Physical Exam  ED Triage Vitals   Temperature Pulse Respirations Blood Pressure SpO2   04/19/24 2022 04/19/24 2022 04/19/24 2022 04/19/24 2027 04/19/24 2022   98 °F (36.7 °C) 68 18 108/64 99 %      Temp Source Heart Rate Source Patient Position - Orthostatic VS BP Location FiO2 (%)   04/19/24 2022 04/19/24 2022 04/19/24 2022 04/19/24 2022 --   Oral Monitor Sitting Right arm       Pain Score       --                    Orthostatic Vital Signs  Vitals:    04/19/24 2022 04/19/24 2027   BP:  108/64   Pulse: 68    Patient Position - Orthostatic VS: Sitting        Physical Exam  Vitals and nursing note reviewed.   Constitutional:       General: She is not in acute  distress.     Appearance: She is well-developed.   HENT:      Head: Normocephalic and atraumatic.   Eyes:      Conjunctiva/sclera: Conjunctivae normal.   Cardiovascular:      Rate and Rhythm: Normal rate and regular rhythm.      Heart sounds: No murmur heard.  Pulmonary:      Effort: Pulmonary effort is normal. No respiratory distress.      Breath sounds: Normal breath sounds.   Abdominal:      Palpations: Abdomen is soft.      Tenderness: There is no abdominal tenderness.   Musculoskeletal:         General: No swelling.   Skin:     General: Skin is warm and dry.   Neurological:      Mental Status: She is alert.   Psychiatric:         Mood and Affect: Mood normal.         ED Medications  Medications - No data to display    Diagnostic Studies  Results Reviewed       None                   XR chest 2 views   ED Interpretation by Gerard Merrill MD (04/19 2104)   No acute cardiopulmonary abnormality identified on my independent evaluation of chest x-ray.            Procedures  Procedures      ED Course                                       Medical Decision Making  23-year-old female presenting with dry cough of 3 weeks duration.  On initial evaluation, patient was in no acute distress and with normal vital signs.  Physical exam was without acute findings.  Given persistent nature of cough, chest x-ray was ordered to look for acute/subacute pathology that would explain her symptoms which showed no acute findings.  Patient was counseled on this result.  It is possible that patient seasonal allergies combined with possible irritants from her apartment are causing postnasal drip that is triggering her cough given that the symptoms are worse when she lays down.  With this in mind, patient was counseled on use of over-the-counter allergy medications and was prescribed Flonase for use at home.  Advised to follow-up with primary care physician especially if symptoms do not improve.  At time of discharge, patient was in  stable condition and in agreement with plan.    Problems Addressed:  Subacute cough: acute illness or injury    Amount and/or Complexity of Data Reviewed  Radiology: ordered and independent interpretation performed.          Disposition  Final diagnoses:   Subacute cough     Time reflects when diagnosis was documented in both MDM as applicable and the Disposition within this note       Time User Action Codes Description Comment    4/19/2024  9:09 PM Monica Paz Add [R05.2] Subacute cough           ED Disposition       ED Disposition   Discharge    Condition   Stable    Date/Time   Fri Apr 19, 2024  9:09 PM    Comment   Moses Cotto discharge to home/self care.                   Follow-up Information       Follow up With Specialties Details Why Contact Info Additional Information    [facility] Schedule an appointment as soon as possible for a visit   [address]  [address]  [facility] [address] [phone] [facility], [address], [place] [id number] [phone]            Discharge Medication List as of 4/19/2024  9:09 PM        START taking these medications    Details   fluticasone (FLONASE) 50 mcg/act nasal spray 1 spray into each nostril daily, Starting Fri 4/19/2024, Normal           CONTINUE these medications which have NOT CHANGED    Details   ibuprofen (MOTRIN) 600 mg tablet Take 1 tablet (600 mg total) by mouth 3 (three) times a day for 5 days, Starting Thu 11/9/2023, Until Tue 11/14/2023, Normal      ondansetron (ZOFRAN) 4 mg tablet Take 1 tablet (4 mg total) by mouth every 6 (six) hours, Starting Mon 1/2/2023, Normal      ondansetron (ZOFRAN-ODT) 4 mg disintegrating tablet Take 1 tablet (4 mg total) by mouth every 6 (six) hours as needed for nausea or vomiting for up to 5 days, Starting Thu 11/9/2023, Until Tue 11/14/2023 at 2359, Normal      oxyCODONE (Roxicodone) 5 immediate release tablet Take  1 tablet (5 mg total) by mouth every 6 (six) hours as needed for moderate pain Max Daily Amount: 20 mg, Starting Thu 11/9/2023, Normal      predniSONE 20 mg tablet Take 2 tabs daily for 5 days., Print      triamcinolone (KENALOG) 0.1 % cream Apply topically 2 (two) times a day, Starting Mon 3/27/2023, Normal           No discharge procedures on file.    PDMP Review       None             ED Provider  Attending physically available and evaluated Moses Cotto. I managed the patient along with the ED Attending.    Electronically Signed by           Monica Paz DO  04/19/24 0744

## 2024-12-31 ENCOUNTER — HOSPITAL ENCOUNTER (EMERGENCY)
Facility: HOSPITAL | Age: 24
Discharge: HOME | End: 2024-12-31
Attending: EMERGENCY MEDICINE | Admitting: EMERGENCY MEDICINE
Payer: COMMERCIAL

## 2024-12-31 VITALS
TEMPERATURE: 97.3 F | HEART RATE: 91 BPM | OXYGEN SATURATION: 99 % | DIASTOLIC BLOOD PRESSURE: 57 MMHG | SYSTOLIC BLOOD PRESSURE: 107 MMHG | RESPIRATION RATE: 18 BRPM

## 2024-12-31 DIAGNOSIS — A08.4 VIRAL GASTROENTERITIS: Primary | ICD-10-CM

## 2024-12-31 LAB
EXT PREGNANCY TEST URINE: NEGATIVE
EXT. CONTROL: NORMAL

## 2024-12-31 PROCEDURE — 99283 EMERGENCY DEPT VISIT LOW MDM: CPT

## 2024-12-31 PROCEDURE — 81025 URINE PREGNANCY TEST: CPT

## 2024-12-31 PROCEDURE — 96372 THER/PROPH/DIAG INJ SC/IM: CPT

## 2024-12-31 PROCEDURE — 99284 EMERGENCY DEPT VISIT MOD MDM: CPT | Performed by: EMERGENCY MEDICINE

## 2024-12-31 RX ORDER — LOPERAMIDE HYDROCHLORIDE 2 MG/1
2 CAPSULE ORAL 4 TIMES DAILY PRN
Qty: 12 CAPSULE | Refills: 0 | Status: SHIPPED | OUTPATIENT
Start: 2024-12-31

## 2024-12-31 RX ORDER — ONDANSETRON 4 MG/1
4 TABLET, ORALLY DISINTEGRATING ORAL ONCE
Status: COMPLETED | OUTPATIENT
Start: 2024-12-31 | End: 2024-12-31

## 2024-12-31 RX ORDER — ONDANSETRON 4 MG/1
4 TABLET, FILM COATED ORAL EVERY 6 HOURS
Qty: 12 TABLET | Refills: 0 | Status: SHIPPED | OUTPATIENT
Start: 2024-12-31

## 2024-12-31 RX ORDER — LOPERAMIDE HYDROCHLORIDE 2 MG/1
4 CAPSULE ORAL ONCE
Status: DISCONTINUED | OUTPATIENT
Start: 2024-12-31 | End: 2024-12-31 | Stop reason: HOSPADM

## 2024-12-31 RX ORDER — ACETAMINOPHEN 325 MG/1
975 TABLET ORAL ONCE
Status: COMPLETED | OUTPATIENT
Start: 2024-12-31 | End: 2024-12-31

## 2024-12-31 RX ORDER — KETOROLAC TROMETHAMINE 30 MG/ML
15 INJECTION, SOLUTION INTRAMUSCULAR; INTRAVENOUS ONCE
Status: COMPLETED | OUTPATIENT
Start: 2024-12-31 | End: 2024-12-31

## 2024-12-31 RX ADMIN — ACETAMINOPHEN 975 MG: 325 TABLET, FILM COATED ORAL at 04:20

## 2024-12-31 RX ADMIN — KETOROLAC TROMETHAMINE 15 MG: 30 INJECTION, SOLUTION INTRAMUSCULAR; INTRAVENOUS at 05:40

## 2024-12-31 RX ADMIN — ONDANSETRON 4 MG: 4 TABLET, ORALLY DISINTEGRATING ORAL at 04:20

## 2024-12-31 NOTE — ED ATTENDING ATTESTATION
12/31/2024  I, Álvaro Garcia DO, saw and evaluated the patient. I have discussed the patient with the resident/non-physician practitioner and agree with the resident's/non-physician practitioner's findings, Plan of Care, and MDM as documented in the resident's/non-physician practitioner's note, except where noted. All available labs and Radiology studies were reviewed.  I was present for key portions of any procedure(s) performed by the resident/non-physician practitioner and I was immediately available to provide assistance.       At this point I agree with the current assessment done in the Emergency Department.  I have conducted an independent evaluation of this patient a history and physical is as follows:    Healthy 23yo female, no phm, no psh accompanied by her male boyfriend.  4d ago having waves of vague abd pain, pain was relatively mild, generalized in the abdomen, unrelated to food.  It was worse with nothing and better with nothing, mild in severity, she did not take any medication for it.  Tonight developed 4 episodes of nonbloody, nonbilious emesis, 2 episodes non bloody, non mucousy diarrhea.  No travel history, no sick contacts, no lightheadedness, no dysuria or hematuria, no vaginal bleeding or discharge.  Not taking any fertility treatments or attempting to get pregnant.  No camping or backpacking or drinking from streams, no recent antibiotics.  No recent hospitalizations.  Her boyfriend says he is feeling well, without symptoms    General:  Patient is well-appearing  Head:  Atraumatic  Eyes:  Conjunctiva pink  ENT:  Mucous membranes are moist  Neck:  Supple  Cardiac:  S1-S2, without murmurs  Lungs:  Clear to auscultation bilaterally  Abdomen:  Soft, nontender, normal bowel sounds, no CVA tenderness, no tympany, no rigidity, no guarding, neg obturator, neg psoas, neg martinez  Extremities:  Normal range of motion  Neurologic:  Awake, fluent speech, normal comprehension. AAOx3.   Skin:  Pink warm  and dry  Psychiatric:  Alert, pleasant, cooperative            ED Course     Labs Reviewed   POCT PREGNANCY, URINE - Normal       Result Value Ref Range Status    EXT Preg Test, Ur Negative   Final    Control Valid   Final       On reassessment after symptomatic management, patient was feeling better, able to tolerate p.o. I do not believe the patient has significant acute pathology causing their symptoms; most likely the patient has a self-limiting syndrome because no significant GI risk factors, no travel history, no recent hospitalization or antibiotics.  Does not appear to be clinically dehydrated, do not believe that laboratory studies additionally are indicated or abdominal imaging. The patient was counseled that this may be the early presentation of more significant pathology, and that they should return to the nearest emergency department for the signs/symptoms in the discharge instruction sheets, or they were otherwise concerned about their medical condition. The patient said they understood this and indicated they would be able to be seen for followup and return if there are worsening signs or symptoms.        DIAGNOSIS:  Acute nausea and vomiting, acute diarrhea    MEDICAL DECISION MAKING CODING    COLLECTION AND INTERPRETATION OF DATA  I reviewed prior external notes, including 2.28.2019 PCP office visit    I ordered each unique test  Tests reviewed personally by me:  Labs: negative pregnancy          Critical Care Time  Procedures

## 2024-12-31 NOTE — DISCHARGE INSTRUCTIONS
You have been evaluated in the emergency room for abdominal pain, vomiting, and diarrhea.  At this time, your symptoms and physical exam are not concerning for a life-threatening or other severe medical diagnosis, and are most likely related to his seasonal viral infection.  You have been prescribed an antinausea medication called Zofran in addition to an antidiarrheal medication called loperamide to help alleviate your symptoms.  Please follow-up with your primary care doctor as needed to evaluate your healing and for any other residual symptoms.  Return to the emergency room for any severe and sudden worsening of your abdominal pain, intractable nausea and vomiting, lightheadedness, dizziness, or passing out, chest pain, shortness of breath, or any other concerning signs or symptoms.

## 2024-12-31 NOTE — ED PROVIDER NOTES
Time reflects when diagnosis was documented in both MDM as applicable and the Disposition within this note       Time User Action Codes Description Comment    12/31/2024  6:40 AM Álvaro dAams Add [A08.4] Viral gastroenteritis           ED Disposition       ED Disposition   Discharge    Condition   Stable    Date/Time   Tue Dec 31, 2024  6:40 AM    Comment   Moses Cotto discharge to home/self care.                   Assessment & Plan       Medical Decision Making  Patient is a 24 y.o. female who presents to the ED with nausea, vomiting, abdominal cramping.    Vital signs remained stable throughout ED course. Exam as listed below.  Patient had good response to symptomatic treatment.    Differential diagnosis includes but is not limited to viral gastroenteritis, bacterial gastroenteritis, early appendicitis.    Plan: Patient to be discharged home.    View ED course above for further discussion on patient workup.     All labs reviewed and utilized in the medical decision making process  All radiology studies independently viewed by me and interpreted by the radiologist.  I reviewed all testing with the patient.     Amount and/or Complexity of Data Reviewed  Labs: ordered.    Risk  OTC drugs.  Prescription drug management.             Medications   loperamide (IMODIUM) capsule 4 mg (4 mg Oral Not Given 12/31/24 0650)   ondansetron (ZOFRAN-ODT) dispersible tablet 4 mg (4 mg Oral Given 12/31/24 0420)   acetaminophen (TYLENOL) tablet 975 mg (975 mg Oral Given 12/31/24 0420)   ketorolac (TORADOL) injection 15 mg (15 mg Intramuscular Given 12/31/24 0540)       ED Risk Strat Scores                                              History of Present Illness       Chief Complaint   Patient presents with    Vomiting     Today pt developed N/V/D, has had abdominal pain the last few days        Past Medical History:   Diagnosis Date    Asthma 9/18/2012    Otitis media     Visual impairment     wears glasses      History reviewed. No  pertinent surgical history.   Family History   Problem Relation Age of Onset    No Known Problems Mother     Diabetes Father     Hypertension Father       Social History     Tobacco Use    Smoking status: Never    Smokeless tobacco: Never   Vaping Use    Vaping status: Never Used   Substance Use Topics    Alcohol use: Never    Drug use: Never      E-Cigarette/Vaping    E-Cigarette Use Never User       E-Cigarette/Vaping Substances    Nicotine No     THC No     CBD No     Flavoring No       I have reviewed and agree with the history as documented.     This is an 24-year-old female, up-to-date on vaccinations and otherwise healthy, who presents to the emergency room with 4 days of intermittent achy abdominal pain and one day onset of nausea and vomiting accompanied by diarrhea.  States that she had abdominal pain below the sternum described as pressure-like and achy which comes and goes intermittently over the last 4 days, and states that last night she began to develop the vomiting and diarrhea symptoms. States that she had no blood or bile in the vomitus or blood in the stool.  Describes the abdominal pain as a cramping sensation below the sternum.  Has not had any fevers associated with this. Denies any chest pain, hematuria, shortness of breath, lightheadedness, dizziness.      Vomiting  Associated symptoms: no abdominal pain, no arthralgias, no chills, no cough, no fever and no sore throat        Review of Systems   Constitutional:  Negative for chills and fever.   HENT:  Negative for ear pain and sore throat.    Eyes:  Negative for pain and visual disturbance.   Respiratory:  Negative for cough and shortness of breath.    Cardiovascular:  Negative for chest pain and palpitations.   Gastrointestinal:  Positive for vomiting. Negative for abdominal pain.   Genitourinary:  Negative for dysuria and hematuria.   Musculoskeletal:  Negative for arthralgias and back pain.   Skin:  Negative for color change and rash.    Neurological:  Negative for seizures and syncope.   All other systems reviewed and are negative.          Objective       ED Triage Vitals   Temperature Pulse Blood Pressure Respirations SpO2 Patient Position - Orthostatic VS   12/31/24 0332 12/31/24 0332 12/31/24 0333 12/31/24 0332 12/31/24 0332 12/31/24 0333   (!) 97.3 °F (36.3 °C) 91 107/57 18 99 % Lying      Temp Source Heart Rate Source BP Location FiO2 (%) Pain Score    12/31/24 0332 12/31/24 0332 12/31/24 0333 -- 12/31/24 0332    Temporal Monitor Right arm  5      Vitals      Date and Time Temp Pulse SpO2 Resp BP Pain Score FACES Pain Rating User   12/31/24 0540 -- -- -- -- -- 4 -- RJ   12/31/24 0420 -- -- -- -- -- 6 -- RJ   12/31/24 0333 -- -- -- -- 107/57 -- -- PT   12/31/24 0332 97.3 °F (36.3 °C) 91 99 % 18 -- 5 -- PT            Physical Exam  Vitals and nursing note reviewed.   Constitutional:       General: She is not in acute distress.     Appearance: She is well-developed.   HENT:      Head: Normocephalic and atraumatic.   Eyes:      Conjunctiva/sclera: Conjunctivae normal.   Cardiovascular:      Rate and Rhythm: Normal rate and regular rhythm.      Heart sounds: No murmur heard.  Pulmonary:      Effort: Pulmonary effort is normal. No respiratory distress.      Breath sounds: Normal breath sounds.   Abdominal:      Palpations: Abdomen is soft.      Tenderness: There is no abdominal tenderness.   Musculoskeletal:         General: No swelling.      Cervical back: Neck supple.   Skin:     General: Skin is warm and dry.      Capillary Refill: Capillary refill takes less than 2 seconds.   Neurological:      Mental Status: She is alert.   Psychiatric:         Mood and Affect: Mood normal.         Results Reviewed       Procedure Component Value Units Date/Time    POCT pregnancy, urine [[id number]]  (Normal) Collected: 12/31/24 0537    Lab Status: Final result Updated: 12/31/24 0539     EXT Preg Test, Ur Negative     Control Valid            No orders to  display       Procedures    ED Medication and Procedure Management   Prior to Admission Medications   Prescriptions Last Dose Informant Patient Reported? Taking?   fluticasone (FLONASE) 50 mcg/act nasal spray   No No   Si spray into each nostril daily   ibuprofen (MOTRIN) 600 mg tablet   No No   Sig: Take 1 tablet (600 mg total) by mouth 3 (three) times a day for 5 days   ondansetron (ZOFRAN) 4 mg tablet   No No   Sig: Take 1 tablet (4 mg total) by mouth every 6 (six) hours   Patient not taking: Reported on 3/27/2023   ondansetron (ZOFRAN-ODT) 4 mg disintegrating tablet   No No   Sig: Take 1 tablet (4 mg total) by mouth every 6 (six) hours as needed for nausea or vomiting for up to 5 days   oxyCODONE (Roxicodone) 5 immediate release tablet   No No   Sig: Take 1 tablet (5 mg total) by mouth every 6 (six) hours as needed for moderate pain Max Daily Amount: 20 mg   predniSONE 20 mg tablet   No No   Sig: Take 2 tabs daily for 5 days.   Patient not taking: Reported on 2019   triamcinolone (KENALOG) 0.1 % cream   No No   Sig: Apply topically 2 (two) times a day   Patient not taking: Reported on 2023      Facility-Administered Medications: None     Discharge Medication List as of 2024  6:42 AM        START taking these medications    Details   loperamide (IMODIUM) 2 mg capsule Take 1 capsule (2 mg total) by mouth 4 (four) times a day as needed for diarrhea, Starting 2024, Normal      !! ondansetron (ZOFRAN) 4 mg tablet Take 1 tablet (4 mg total) by mouth every 6 (six) hours, Starting 2024, Normal       !! - Potential duplicate medications found. Please discuss with provider.        CONTINUE these medications which have NOT CHANGED    Details   fluticasone (FLONASE) 50 mcg/act nasal spray 1 spray into each nostril daily, Starting 2024, Normal      ibuprofen (MOTRIN) 600 mg tablet Take 1 tablet (600 mg total) by mouth 3 (three) times a day for 5 days, Starting Thu 2023,  Until Tue 11/14/2023, Normal      !! ondansetron (ZOFRAN) 4 mg tablet Take 1 tablet (4 mg total) by mouth every 6 (six) hours, Starting Mon 1/2/2023, Normal      ondansetron (ZOFRAN-ODT) 4 mg disintegrating tablet Take 1 tablet (4 mg total) by mouth every 6 (six) hours as needed for nausea or vomiting for up to 5 days, Starting Thu 11/9/2023, Until Tue 11/14/2023 at 2359, Normal      oxyCODONE (Roxicodone) 5 immediate release tablet Take 1 tablet (5 mg total) by mouth every 6 (six) hours as needed for moderate pain Max Daily Amount: 20 mg, Starting Thu 11/9/2023, Normal      predniSONE 20 mg tablet Take 2 tabs daily for 5 days., Print      triamcinolone (KENALOG) 0.1 % cream Apply topically 2 (two) times a day, Starting Mon 3/27/2023, Normal       !! - Potential duplicate medications found. Please discuss with provider.        No discharge procedures on file.  ED SEPSIS DOCUMENTATION   Time reflects when diagnosis was documented in both MDM as applicable and the Disposition within this note       Time User Action Codes Description Comment    12/31/2024  6:40 AM Álvaro Adams [A08.4] Viral gastroenteritis                  Álvaro Adams DO  12/31/24 0752

## 2024-12-31 NOTE — Clinical Note
Moses Cotto was seen and treated in our emergency department on 12/31/2024.        Other - See Comments        Diagnosis:     Moses  may return to work on return date.    She may return on this date: 01/01/2025         If you have any questions or concerns, please don't hesitate to call.      Álvaro Adams, DO    ______________________________           _______________          _______________  Hospital Representative                              Date                                Time

## 2025-03-24 ENCOUNTER — ANNUAL EXAM (OUTPATIENT)
Dept: OBGYN CLINIC | Facility: CLINIC | Age: 25
End: 2025-03-24
Payer: COMMERCIAL

## 2025-03-24 VITALS
BODY MASS INDEX: 22.38 KG/M2 | DIASTOLIC BLOOD PRESSURE: 52 MMHG | WEIGHT: 114 LBS | SYSTOLIC BLOOD PRESSURE: 98 MMHG | HEIGHT: 60 IN

## 2025-03-24 DIAGNOSIS — Z01.411 ENCOUNTER FOR GYNECOLOGICAL EXAMINATION WITH ABNORMAL FINDING: Primary | ICD-10-CM

## 2025-03-24 DIAGNOSIS — N89.8 VAGINAL DISCHARGE: ICD-10-CM

## 2025-03-24 DIAGNOSIS — N89.8 VAGINAL ODOR: ICD-10-CM

## 2025-03-24 DIAGNOSIS — Z11.3 SCREENING EXAMINATION FOR STD (SEXUALLY TRANSMITTED DISEASE): ICD-10-CM

## 2025-03-24 DIAGNOSIS — Z12.4 ENCOUNTER FOR PAPANICOLAOU SMEAR FOR CERVICAL CANCER SCREENING: ICD-10-CM

## 2025-03-24 PROCEDURE — 87660 TRICHOMONAS VAGIN DIR PROBE: CPT | Performed by: NURSE PRACTITIONER

## 2025-03-24 PROCEDURE — 99213 OFFICE O/P EST LOW 20 MIN: CPT | Performed by: NURSE PRACTITIONER

## 2025-03-24 PROCEDURE — 87510 GARDNER VAG DNA DIR PROBE: CPT | Performed by: NURSE PRACTITIONER

## 2025-03-24 PROCEDURE — 99385 PREV VISIT NEW AGE 18-39: CPT | Performed by: NURSE PRACTITIONER

## 2025-03-24 PROCEDURE — 87491 CHLMYD TRACH DNA AMP PROBE: CPT | Performed by: NURSE PRACTITIONER

## 2025-03-24 PROCEDURE — G0145 SCR C/V CYTO,THINLAYER,RESCR: HCPCS | Performed by: NURSE PRACTITIONER

## 2025-03-24 PROCEDURE — 87591 N.GONORRHOEAE DNA AMP PROB: CPT | Performed by: NURSE PRACTITIONER

## 2025-03-24 PROCEDURE — 87480 CANDIDA DNA DIR PROBE: CPT | Performed by: NURSE PRACTITIONER

## 2025-03-24 NOTE — PROGRESS NOTES
Assessment / Plan    Assessment & Plan  Encounter for gynecological examination with abnormal finding  Well woman exam.  First pap smear collected.  BC condoms       Encounter for Papanicolaou smear for cervical cancer screening    Orders:    Liquid-based pap, screening    Screening examination for STD (sexually transmitted disease)  Agrees to screening    Orders:    Chlamydia/GC amplified DNA by PCR    Vaginal odor  Will treat accordingly    Orders:    VAGINOSIS DNA PROBE    Vaginal discharge  As above    Orders:    VAGINOSIS DNA PROBE        Subjective   NEW PATIENT     Moses Cotto is a 24 y.o. female who presents for her annual gynecologic exam.    25 yo G0 presents for her yearly and also has a concern.  Prior GYN care at [facility], last seen 3/2023.    Patient reports having a vaginal odor, asks about bacterial vaginosis.  Occasional vaginal itching.  denies increased vaginal discharge, denies abn vaginal bleeding, pelvic pain or UTI symptoms.  No recent antibiotic treatment.  Sexually active, partner of 8 months, uses condoms.  STD hx none    Last pap: n/a  Pap hx: n/a  STD screenin2023 G/C negative  STD hx: none  Sexually active: yes  Condom use: yes  Gardasil vaccine: 1 of 2  Body mass index is 22.26 kg/m².  Calcium intake: given guidelines  Exercise: given guidelines  Safety: feels safe    Periods are regular  Current contraception: condoms  History of abnormal Pap smear: no  Family history of breast,uterine, ovarian or colon cancer: no    The following portions of the patient's history were reviewed and updated as appropriate: allergies, current medications, past family history, past medical history, past social history, past surgical history, and problem list.    Review of Systems      Review of Systems   Constitutional:  Negative for chills and fever.   Respiratory:  Negative for cough and shortness of breath.    Gastrointestinal:  Negative for abdominal distention, abdominal pain, blood in stool,  constipation, diarrhea, nausea and vomiting.   Genitourinary:  Negative for difficulty urinating, dyspareunia, dysuria, frequency, genital sores, hematuria, menstrual problem, pelvic pain, urgency, vaginal bleeding, vaginal discharge and vaginal pain.        Vaginal odor   Musculoskeletal:  Negative for arthralgias and myalgias.     Breasts:  Negative for skin changes, dimpling, asymmetry, nipple discharge, redness, tenderness or palpable masses    Objective     *patient agrees to exam without a chaperone present.     BP 98/52 (BP Location: Left arm, Patient Position: Sitting, Cuff Size: Standard)   Ht 5' (1.524 m)   Wt 51.7 kg (114 lb)   LMP 03/01/2025 (Exact Date)   BMI 22.26 kg/m²   Physical Exam  Constitutional:       General: She is not in acute distress.     Appearance: Normal appearance. She is well-developed and normal weight. She is not ill-appearing or diaphoretic.   HENT:      Head: Normocephalic and atraumatic.   Eyes:      Pupils: Pupils are equal, round, and reactive to light.   Neck:      Thyroid: No thyromegaly.   Pulmonary:      Effort: Pulmonary effort is normal.   Chest:   Breasts:     Breasts are symmetrical.      Right: No inverted nipple, mass, nipple discharge, skin change or tenderness.      Left: No inverted nipple, mass, nipple discharge, skin change or tenderness.   Abdominal:      General: There is no distension.      Palpations: Abdomen is soft. There is no mass.      Tenderness: There is no abdominal tenderness. There is no guarding or rebound.   Genitourinary:     General: Normal vulva.      Exam position: Lithotomy position.      Labia:         Right: No rash, tenderness, lesion or injury.         Left: No rash, tenderness, lesion or injury.       Vagina: No signs of injury and foreign body. Vaginal discharge (moderate, creamy, off white) present. No erythema, tenderness or bleeding.      Cervix: No cervical motion tenderness, discharge or friability.      Uterus: Not enlarged and  not tender.       Adnexa:         Right: No mass or tenderness.          Left: No mass or tenderness.        Rectum: Normal.   Musculoskeletal:      Cervical back: Neck supple.   Lymphadenopathy:      Cervical: No cervical adenopathy.      Upper Body:      Right upper body: No supraclavicular adenopathy.      Left upper body: No supraclavicular adenopathy.   Skin:     General: Skin is warm and dry.   Neurological:      General: No focal deficit present.      Mental Status: She is alert and oriented to person, place, and time.   Psychiatric:         Mood and Affect: Mood normal.         Behavior: Behavior normal.         Thought Content: Thought content normal.         Judgment: Judgment normal.

## 2025-03-24 NOTE — PATIENT INSTRUCTIONS
Patient Education     Calcium Rich Diet   About this topic   Calcium is one of the most important minerals and is found in almost all parts of your body. It makes your teeth and bones strong and healthy. Your body does not make calcium. It is important for you to eat calcium rich foods to get enough calcium. It also helps your body:  Make blood clots  Keep your heartbeat normal  Helps blood move throughout the body  Release hormones  Release enzymes  Send and get signals between your nerves and brain  Make muscles work well         What will the results be?   It is important to have a good amount of calcium in your food. Calcium helps your body work well. It is very important during every life stage. Calcium may also keep you from losing bone mass. This is osteopenia. It may help keep you from having weak bones. This is osteoporosis.  What drugs may be needed?   The doctor may order calcium and vitamin D supplements. You need vitamin D to help your body take in the calcium. Your calcium supplement may have vitamin D in it.  Talk to your doctor about:  If it is OK to take your calcium with food.  How often to take your calcium supplement throughout the day.  All the drugs you are taking. Be sure to include all prescription and over-the-counter (OTC) drugs, and herbal supplements. Tell the doctor about any drug allergy. Bring a list of drugs you take with you. Some drugs may cause problems with how your body takes in calcium.  Will physical activity be limited?   No, physical activities will not be limited. It is good for you to do light exercises and to stay active.  What changes to diet are needed?   You will need to watch how much calcium is in the foods you eat. Your doctor will talk to you about the right amount of calcium for you. How much calcium you need is based on your age and life stage.  When is this diet used?   This diet is used when your normal diet is low in calcium. It is needed to raise the amount of  calcium in your diet. Our bodies do not take in calcium well as we get older.  Who should not use this diet?   People with too much calcium in their blood should not use this diet. This is called hypercalcemia.  What foods are good to eat?   Milk, yogurt, and cheese products are naturally high in calcium.  These foods have smaller amounts of calcium. If they are eaten often and in large amounts they can be good sources of calcium:  Oysters; dried fruit like figs and raisins; green leafy vegetables like broccoli, collards, kale, mustard greens, bok arvind; soy beans; oranges  [place] and sardines. Be sure to eat the soft bones.  Nuts like almonds and Brazil nuts, sunflower seeds, tahini, dried beans  Food products with calcium added to them like orange juice, tofu, cereal, bread; almond milk, rice milk, soy milk  What foods should be limited or avoided?   People who eat many kinds of foods do not have to be worried about limiting or avoiding certain foods.   What problems could happen?   Some people may get kidney stones. This may happen after taking high amounts of calcium over a long time. Calcium from food does not seem to cause kidney stones.  Hard stools.  Too much calcium may interfere with your body’s ability to absorb iron and zinc.  When do I need to call the doctor?   Call your doctor if you have concerns about your diet. Tell your doctor if you are allergic to any of the foods in your diet.  Helpful tips   If you have problems digesting milk, try lactose-free milk. You may also use a product to help you take in lactose.  Talk with your doctor if you are a vegetarian and do not eat dairy products. Your doctor can help you make sure you get the amount of calcium your body needs.  Last Reviewed Date   2021-03-12  Consumer Information Use and Disclaimer   This generalized information is a limited summary of diagnosis, treatment, and/or medication information. It is not meant to be comprehensive and should be used  as a tool to help the user understand and/or assess potential diagnostic and treatment options. It does NOT include all information about conditions, treatments, medications, side effects, or risks that may apply to a specific patient. It is not intended to be medical advice or a substitute for the medical advice, diagnosis, or treatment of a health care provider based on the health care provider's examination and assessment of a patient’s specific and unique circumstances. Patients must speak with a health care provider for complete information about their health, medical questions, and treatment options, including any risks or benefits regarding use of medications. This information does not endorse any treatments or medications as safe, effective, or approved for treating a specific patient. Up[employer] Inc[employer] and its affiliates disclaim any warranty or liability relating to this information or the use thereof. The use of this information is governed by the Terms of Use, available at https://www.[employer]/en/know/clinical-effectiveness-terms   Copyright   Copyright © 2024 Up[employer]Date, Inc. and its affiliates and/or licensors. All rights reserved.  Patient Education     Exercise and movement   The Basics   Written by the doctors and editors at [employer]   What are the benefits of movement? -- Moving your body has many benefits. It can:   Burn calories, which helps people manage their weight   Help control blood sugar levels in people with diabetes   Lower blood pressure, especially in people with high blood pressure   Lower stress, and help with depression and anxiety   Keep bones strong, so they don't get thin and break easily   Lower the chance of dying from heart disease  Adding even small amounts of physical activity to your daily routine can improve your health.  What are the main types of exercise? -- There are 3 main types of exercise:   Aerobic exercise - This raises your heart rate. Examples include  walking, running, dancing, riding a bike, and swimming.   Muscle strengthening - This helps make your muscles stronger. You can do it using weights, exercise bands, or weight machines. You can also use your own body weight, as with push-ups, or by lifting items in your home, like jugs of water.   Stretching - These help your muscles and joints move more easily.  It's important to have all 3 types in your exercise program. That way, your body, muscles, and joints can be as healthy as possible.  Should I talk to my doctor or nurse before I start exercising? -- If you have not exercised before or have not exercised in a long time, talk with your doctor or nurse before you start a very active exercise program.  If you have heart disease or risk factors for heart disease (like high blood pressure or diabetes), your doctor or nurse might recommend that you have an exercise test before starting an exercise program.  When you begin an exercise program, start slowly. For example, do the exercise at a slow pace or for a few minutes only. Over time, you can exercise faster and for longer periods of time.  What should I do when I exercise? -- Each time you exercise, you should:   Warm up - This can help keep you from hurting your muscles when you exercise. To warm up, do a light aerobic exercise (such as walking slowly) or stretch for 5 to 10 minutes.   Work out - Try to get a mix of aerobic exercise, muscle strengthening, and stretching. During an aerobic workout, you can walk fast, swim, run, or use an exercise machine, for example. Other activities, like dancing or playing tennis, are also forms of aerobic exercise. You should also take time to stretch all of your joints, including your neck, shoulders, back, hips, and knees. At least 2 times a week, you can do muscle strengthening exercises as part of your workout.   Cool down - This helps keep you from feeling dizzy after you exercise and helps prevent muscle cramps. To  cool down, you can stretch or do a light aerobic exercise for 5 minutes.  Some people go to a gym or do group exercise classes. But you can exercise even without these things. Some exercises can be done even in a small space. You can also try online videos or smartphone apps to get ideas for different types of exercise.  How often should I exercise? -- Doctors recommend that people exercise at least 30 minutes a day, on 5 or more days of the week.  If you can't exercise for 30 minutes straight, try to exercise for 10 minutes at a time, 3 or 4 times a day. Even exercising for shorter amounts of time is good for you, especially if it means spending less time sitting.  When should I call my doctor or nurse? -- If you have any of the following symptoms when you exercise, stop exercising and call your doctor or nurse right away:   Pain or pressure in your chest, arms, throat, jaw, or back   Nausea or vomiting   Feeling like your heart is fluttering or racing very fast   Feeling dizzy or faint  What if I don't have time to exercise? -- Many people have very busy lives and might not think that they have time to exercise. But it's important to try to find time, even if you are tired or work a lot. Exercise can increase your energy level, which can make you feel better and might even help you get more work done.  Even if it's hard to set aside a lot of time to exercise, you can still improve your health by moving your body more. There are many ways that you can be more active. For example, you can:   Take the stairs instead of the elevator.   Park in a parking space that is farther away from the door.   Take a longer route when you walk from one place to another.  Spending a lot of time sitting still (for example, watching TV or working on the computer) is bad for your health. Try to get up and move around whenever you can. Even small amounts of movement, like taking short walks, doing household chores, or gardening, can  improve your health. Finding activities that you enjoy, or doing them with other people, can help you add more movement into your daily life.  What else should I do when I exercise? -- To exercise safely and avoid problems, it's important to:   Drink fluids during and after exercising (but avoid drinks with a lot of caffeine or sugar).   Avoid exercising outside if it is too hot or cold.   Wear layers of clothes, so that you can take them off if you get too hot.   Wear shoes that fit well and support your feet.   Be aware of your surroundings if you exercise outside.  All topics are updated as new evidence becomes available and our peer review process is complete.  This topic retrieved from [employer] on: May 18, 2024.  Topic 19563 Version 31.0  Release: 32.4.3 - C32.137  © 2024 UpToDate, Inc. and/or its affiliates. All rights reserved.  Consumer Information Use and Disclaimer   Disclaimer: This generalized information is a limited summary of diagnosis, treatment, and/or medication information. It is not meant to be comprehensive and should be used as a tool to help the user understand and/or assess potential diagnostic and treatment options. It does NOT include all information about conditions, treatments, medications, side effects, or risks that may apply to a specific patient. It is not intended to be medical advice or a substitute for the medical advice, diagnosis, or treatment of a health care provider based on the health care provider's examination and assessment of a patient's specific and unique circumstances. Patients must speak with a health care provider for complete information about their health, medical questions, and treatment options, including any risks or benefits regarding use of medications. This information does not endorse any treatments or medications as safe, effective, or approved for treating a specific patient. UpToDate, Inc. and its affiliates disclaim any warranty or liability relating to  this information or the use thereof.The use of this information is governed by the Terms of Use, available at https://www.[employer].com/en/know/clinical-effectiveness-terms. 2024© UpToDate, Inc. and its affiliates and/or licensors. All rights reserved.  Copyright   © 2024 [employer] and/or its affiliates. All rights reserved.

## 2025-03-25 ENCOUNTER — RESULTS FOLLOW-UP (OUTPATIENT)
Dept: OBGYN CLINIC | Facility: CLINIC | Age: 25
End: 2025-03-25

## 2025-03-25 DIAGNOSIS — B96.89 BACTERIAL VAGINOSIS: Primary | ICD-10-CM

## 2025-03-25 DIAGNOSIS — N76.0 BACTERIAL VAGINOSIS: Primary | ICD-10-CM

## 2025-03-25 LAB
CANDIDA RRNA VAG QL PROBE: NOT DETECTED
G VAGINALIS RRNA GENITAL QL PROBE: DETECTED
T VAGINALIS RRNA GENITAL QL PROBE: NOT DETECTED

## 2025-03-25 RX ORDER — METRONIDAZOLE 500 MG/1
500 TABLET ORAL EVERY 12 HOURS SCHEDULED
Qty: 14 TABLET | Refills: 0 | Status: SHIPPED | OUTPATIENT
Start: 2025-03-25 | End: 2025-04-01

## 2025-03-26 LAB
C TRACH DNA SPEC QL NAA+PROBE: NEGATIVE
N GONORRHOEA DNA SPEC QL NAA+PROBE: NEGATIVE

## 2025-03-27 LAB
LAB AP GYN PRIMARY INTERPRETATION: NORMAL
Lab: NORMAL
PATH INTERP SPEC-IMP: NORMAL